# Patient Record
Sex: MALE | Race: OTHER | HISPANIC OR LATINO
[De-identification: names, ages, dates, MRNs, and addresses within clinical notes are randomized per-mention and may not be internally consistent; named-entity substitution may affect disease eponyms.]

---

## 2021-07-31 ENCOUNTER — TRANSCRIPTION ENCOUNTER (OUTPATIENT)
Age: 31
End: 2021-07-31

## 2021-08-04 ENCOUNTER — TRANSCRIPTION ENCOUNTER (OUTPATIENT)
Age: 31
End: 2021-08-04

## 2021-08-04 ENCOUNTER — APPOINTMENT (OUTPATIENT)
Dept: FAMILY MEDICINE | Facility: CLINIC | Age: 31
End: 2021-08-04
Payer: COMMERCIAL

## 2021-08-04 ENCOUNTER — APPOINTMENT (OUTPATIENT)
Dept: INTERNAL MEDICINE | Facility: CLINIC | Age: 31
End: 2021-08-04
Payer: COMMERCIAL

## 2021-08-04 DIAGNOSIS — U07.1 COVID-19: ICD-10-CM

## 2021-08-04 DIAGNOSIS — J45.909 UNSPECIFIED ASTHMA, UNCOMPLICATED: ICD-10-CM

## 2021-08-04 PROCEDURE — 99443: CPT

## 2021-08-04 RX ORDER — ALBUTEROL SULFATE 90 UG/1
108 (90 BASE) INHALANT RESPIRATORY (INHALATION)
Qty: 2 | Refills: 1 | Status: ACTIVE | COMMUNITY
Start: 1900-01-01 | End: 1900-01-01

## 2021-08-04 NOTE — HISTORY OF PRESENT ILLNESS
[Home] : at home, [unfilled] , at the time of the visit. [Medical Office: ([facility])___] : at the medical office located in  [Verbal consent obtained from patient] : the patient, [unfilled] [FreeTextEntry8] : Mr. DANISHA ODELL is a pleasant 31 year old male with PMH of asthma, last crisis 1 year ago, never hospitalized and not on daily medications who is being seen via telephone for COVID-19 + test. Patient started with body aches on 07/29/2021, light handedness. He went to [facility] on  07/31/2021. Patient also has developed a stuffy nose, cough, headache. He has felt shortness of breath since yesterday which is not worsening today. His 2 kids and his wife are also sick and tested positive for COVID-19. Patient is eating and drinking, tolerating the oral diet. Denies fever, diarrhea, nausea, vomiting.

## 2021-08-04 NOTE — ASSESSMENT
[FreeTextEntry1] : \par In regards Upper respiratory infection 2/2 COVID-19. \par Patient reports shortness of breath but he is speaking in full sentences over the phone.\par Sending a Medrol-Pack\par Patient sent the + COVID-19 test proof, positive on 07/31/2021 at [facility]. I offered him to be enrolled into the antibody infusion treatment. I advised him that this is still a research treatment. Patient agrees. I filled out the form. Patient will wait fort the call to schedule the appointment.\par I advised the patient to take acetaminophen/ibuprofen for fever and pain, increase his oral hydration. Also to monitor for worsening symptoms including but not limited to uncontrolled fever, shortness of breath, O2 sat  < 95% worsening sore throat, weakness, being unable to tolerate the oral intake and to go to the Emergency department if the symptoms worsen. \par Advised to social distance, wear a mask and continue to follow up CDC guidelines for infectious diseases including but not limited to COVID-19 \par \par Asthma\par Sending albuterol inhaler\par Steroids sent as noted above\par Advised to go to the emergency department if his symptoms worsen\par \par Return to care: within 1 month for CPE or earlier if needed\par Call or return for any questions

## 2021-08-10 ENCOUNTER — NON-APPOINTMENT (OUTPATIENT)
Age: 31
End: 2021-08-10

## 2021-08-10 RX ORDER — METHYLPREDNISOLONE 4 MG/1
4 TABLET ORAL
Qty: 1 | Refills: 0 | Status: ACTIVE | COMMUNITY
Start: 2021-08-10 | End: 1900-01-01

## 2021-11-29 ENCOUNTER — OUTPATIENT (OUTPATIENT)
Dept: OUTPATIENT SERVICES | Facility: HOSPITAL | Age: 31
LOS: 1 days | End: 2021-11-29
Payer: COMMERCIAL

## 2021-11-29 DIAGNOSIS — Z20.828 CONTACT WITH AND (SUSPECTED) EXPOSURE TO OTHER VIRAL COMMUNICABLE DISEASES: ICD-10-CM

## 2021-11-29 LAB — SARS-COV-2 RNA SPEC QL NAA+PROBE: SIGNIFICANT CHANGE UP

## 2021-11-29 PROCEDURE — U0003: CPT

## 2021-11-29 PROCEDURE — U0005: CPT
